# Patient Record
Sex: FEMALE | Race: BLACK OR AFRICAN AMERICAN | NOT HISPANIC OR LATINO | URBAN - METROPOLITAN AREA
[De-identification: names, ages, dates, MRNs, and addresses within clinical notes are randomized per-mention and may not be internally consistent; named-entity substitution may affect disease eponyms.]

---

## 2019-02-05 ENCOUNTER — EMERGENCY (EMERGENCY)
Facility: HOSPITAL | Age: 19
LOS: 1 days | Discharge: HOME | End: 2019-02-05
Attending: EMERGENCY MEDICINE | Admitting: EMERGENCY MEDICINE
Payer: COMMERCIAL

## 2019-02-05 VITALS
SYSTOLIC BLOOD PRESSURE: 114 MMHG | OXYGEN SATURATION: 100 % | TEMPERATURE: 97 F | DIASTOLIC BLOOD PRESSURE: 82 MMHG | RESPIRATION RATE: 16 BRPM | HEART RATE: 60 BPM

## 2019-02-05 DIAGNOSIS — N94.6 DYSMENORRHEA, UNSPECIFIED: ICD-10-CM

## 2019-02-05 DIAGNOSIS — J45.909 UNSPECIFIED ASTHMA, UNCOMPLICATED: ICD-10-CM

## 2019-02-05 DIAGNOSIS — R10.9 UNSPECIFIED ABDOMINAL PAIN: ICD-10-CM

## 2019-02-05 DIAGNOSIS — Z79.899 OTHER LONG TERM (CURRENT) DRUG THERAPY: ICD-10-CM

## 2019-02-05 PROCEDURE — 99283 EMERGENCY DEPT VISIT LOW MDM: CPT

## 2019-02-05 PROCEDURE — 99284 EMERGENCY DEPT VISIT MOD MDM: CPT

## 2019-02-05 RX ORDER — ALBUTEROL 90 UG/1
0 AEROSOL, METERED ORAL
Qty: 0 | Refills: 0 | COMMUNITY

## 2019-02-05 NOTE — ED PROVIDER NOTE - ATTENDING CONTRIBUTION TO CARE
The pt is a 17 y/o F, who presents to ED stating "I'm ready to go home" - was at work, got her menstrual period, was having her typical menstrual cramps and then got very hot, took advil but states that it did not kick in fast enough, so she came to ED - now cramp free. States that she always gets menstrual cramps, symptoms not any worse today, and always gets "hot". Currently complaint / symptom free. Denies cp, sob, dizziness, syncope, n/v, abd pain, dysuria.    PT stating she doesn't have complaints and feels better  U preg neg  Most likely menstrual cramps s/w pt's pain in the past.    Pt took advil and now wants to go home.

## 2019-02-05 NOTE — ED PROVIDER NOTE - MEDICAL DECISION MAKING DETAILS
pt came in for menstrual cramps - took advil PTA, states that felt like the medicine did not suppress symptoms quickly enough, so she came to ed, upon arrival and eval - cramps free, no pain, wanting to go, ucg neg, abd benign, no indication for labs / imaging at this time. pt happy w/dc

## 2019-02-05 NOTE — ED ADULT TRIAGE NOTE - CHIEF COMPLAINT QUOTE
pt c/o abdominal pain. pt states " I just got my period today. I need pain medication now." pt is agitated in triage.

## 2019-02-05 NOTE — ED PROVIDER NOTE - OBJECTIVE STATEMENT
The pt is a 17 y/o F, who presents to ED stating "I'm ready to go home" - was at work, got her menstrual period, was having her typical menstrual cramps and then got very hot, took advil but states that it did not kick in fast enough, so she came to ED - now cramp free. States that she always gets menstrual cramps, symptoms not any worse today, and always gets "hot". Currently complaint / symptom free. Denies cp, sob, dizziness, syncope, n/v, abd pain, dysuria.

## 2019-02-05 NOTE — ED ADULT NURSE NOTE - NSIMPLEMENTINTERV_GEN_ALL_ED
Implemented All Universal Safety Interventions:  [place] to call system. Call bell, personal items and telephone within reach. Instruct patient to call for assistance. Room bathroom lighting operational. Non-slip footwear when patient is off stretcher. Physically safe environment: no spills, clutter or unnecessary equipment. Stretcher in lowest position, wheels locked, appropriate side rails in place.

## 2024-03-19 NOTE — ED ADULT NURSE NOTE - OBJECTIVE STATEMENT
PAST SURGICAL HISTORY:  H/O:   &     History of D&C 2019    S/P cholecystectomy     
BIBA from work c/o abdominal pain and quotes "it's because of my period. I also had hot flashes while I was in the kitchen and felt dizzy." Denies loc or any other complaints at the moment. Pt states feeling better and had taken Advil at ~14:15. Pending physician eval. Will continue to monitor.